# Patient Record
Sex: FEMALE | Race: WHITE | URBAN - METROPOLITAN AREA
[De-identification: names, ages, dates, MRNs, and addresses within clinical notes are randomized per-mention and may not be internally consistent; named-entity substitution may affect disease eponyms.]

---

## 2018-02-22 PROCEDURE — 88175 CYTOPATH C/V AUTO FLUID REDO: CPT | Performed by: OBSTETRICS & GYNECOLOGY

## 2025-05-23 ENCOUNTER — HOSPITAL ENCOUNTER (OUTPATIENT)
Age: 55
Discharge: HOME | End: 2025-05-23
Payer: COMMERCIAL

## 2025-05-23 VITALS
HEART RATE: 68 BPM | RESPIRATION RATE: 20 BRPM | OXYGEN SATURATION: 99 % | DIASTOLIC BLOOD PRESSURE: 85 MMHG | SYSTOLIC BLOOD PRESSURE: 124 MMHG | TEMPERATURE: 98 F

## 2025-05-23 DIAGNOSIS — H10.9 BACTERIAL CONJUNCTIVITIS OF LEFT EYE: Primary | ICD-10-CM

## 2025-05-23 DIAGNOSIS — J06.9 VIRAL URI: ICD-10-CM

## 2025-05-23 LAB — S PYO AG THROAT QL: NEGATIVE

## 2025-05-23 RX ORDER — POLYMYXIN B SULFATE AND TRIMETHOPRIM 1; 10000 MG/ML; [USP'U]/ML
1 SOLUTION OPHTHALMIC EVERY 6 HOURS
Qty: 10 ML | Refills: 0 | Status: SHIPPED | OUTPATIENT
Start: 2025-05-23 | End: 2025-05-25

## 2025-05-23 NOTE — ED PROVIDER NOTES
Patient Seen in: [facility]        History  Chief Complaint   Patient presents with    Eye Problem    Sore Throat     Stated Complaint: pink eye    Subjective:   55-year-old female with medical conditions as noted below presents with complaints of right eye redness and yellow drainage onset yesterday.  Nasal congestion, sinus pressure, intermittent headache and sore throat x 5 days.  No over-the-counter meds taken.  No fever/chills, difficulty swallowing, visual changes, dizziness.                      Objective:     Past Medical History:    Anxiety    Cervical dysplasia    Lyme disease              Past Surgical History:   Procedure Laterality Date          Cold therapy      Colonoscopy      Hysterectomy      with bowel and bladder repair (prolapse) with Dr. Kasia Goetz          Other surgical history      microdysectomy in the back                 Social History     Socioeconomic History    Marital status:    Tobacco Use    Smoking status: Never    Smokeless tobacco: Never   Substance and Sexual Activity    Alcohol use: Yes     Comment: 1-2 weekly    Drug use: No    Sexual activity: Yes     Birth control/protection: Hysterectomy     Social Drivers of Health     Transportation Needs: No Transportation Needs (2023)    Received from [facility] - Transportation     Lack of Transportation (Medical): No     Lack of Transportation (Non-Medical): No              Review of Systems   Constitutional:  Negative for chills and fever.   HENT:  Positive for congestion, sinus pressure and sore throat.    Eyes:  Positive for discharge and redness. Negative for photophobia, pain, itching and visual disturbance.   Gastrointestinal:  Negative for abdominal pain, diarrhea, nausea and vomiting.   Neurological:  Positive for headaches. Negative for dizziness and light-headedness.   All other systems reviewed and are negative.      Positive for stated  complaint: pink eye  Other systems are as noted in HPI.  Constitutional and vital signs reviewed.      All other systems reviewed and negative except as noted above.                  Physical Exam    ED Triage Vitals [05/23/25 1249]   /85   Pulse 68   Resp 20   Temp 98.3 °F (36.8 °C)   Temp src Oral   SpO2 99 %   O2 Device None (Room air)       Current Vitals:   Vital Signs  BP: 124/85  Pulse: 68  Resp: 20  Temp: 98.3 °F (36.8 °C)  Temp src: Oral    Oxygen Therapy  SpO2: 99 %  O2 Device: None (Room air)            Physical Exam  Vitals and nursing note reviewed.   Constitutional:       General: She is not in acute distress.     Appearance: Normal appearance. She is not ill-appearing.   HENT:      Head: Normocephalic.      Right Ear: Tympanic membrane and external ear normal.      Left Ear: Tympanic membrane and external ear normal.      Nose: Nose normal.      Mouth/Throat:      Mouth: Mucous membranes are moist.      Pharynx: Oropharynx is clear. Uvula midline. Posterior oropharyngeal erythema present.      Tonsils: No tonsillar exudate. 1+ on the left.   Eyes:      General: Lids are normal. Vision grossly intact.         Left eye: Discharge present.     Extraocular Movements: Extraocular movements intact.      Conjunctiva/sclera:      Left eye: Left conjunctiva is injected.      Pupils: Pupils are equal, round, and reactive to light.   Cardiovascular:      Rate and Rhythm: Normal rate and regular rhythm.   Pulmonary:      Effort: Pulmonary effort is normal.      Breath sounds: Normal breath sounds.   Musculoskeletal:         General: Normal range of motion.   Skin:     General: Skin is warm.   Neurological:      General: No focal deficit present.      Mental Status: She is alert and oriented to person, place, and time.      GCS: GCS eye subscore is 4. GCS verbal subscore is 5. GCS motor subscore is 6.   Psychiatric:         Behavior: Behavior is cooperative.                 ED Course  Labs Reviewed   POCT  RAPID STREP - Normal                            MDM             Medical Decision Making  Patient is well-appearing.  I discussed differentials with patient including but not limited to viral uri, sinusitis, strep pharyngitis. Viral vs bacterial conjunctivitis  Rapid strep negative  Push fluids, cool mist humidifier, warm salt water gargles, warm towel to clean lashes, good hand washing  Rx polytrim eye drop  otc meds prn  Fu with PCP. Return/ ED precautions discussed      Problems Addressed:  Bacterial conjunctivitis of left eye: acute illness or injury  Viral URI: acute illness or injury    Amount and/or Complexity of Data Reviewed  Labs: ordered. Decision-making details documented in ED Course.    Risk  OTC drugs.  Prescription drug management.        Disposition and Plan     Clinical Impression:  1. Bacterial conjunctivitis of left eye    2. Viral URI         Disposition:  Discharge  5/23/2025  1:27 pm    Follow-up:  Gaviota Olivas MD  [address] [address]  [phone]      or follow up with your Primary Doctor          Medications Prescribed:  Discharge Medication List as of 5/23/2025  1:21 PM                Supplementary Documentation:

## 2025-05-23 NOTE — ED INITIAL ASSESSMENT (HPI)
Pt presents with left eye redness x 24 hours.. Sore throat, sinus pressure with headache x 5 days.     No fever.

## 2025-05-25 RX ORDER — POLYMYXIN B SULFATE AND TRIMETHOPRIM 1; 10000 MG/ML; [USP'U]/ML
1 SOLUTION OPHTHALMIC EVERY 6 HOURS
Qty: 10 ML | Refills: 0 | Status: SHIPPED | OUTPATIENT
Start: 2025-05-25 | End: 2025-06-01

## 2025-05-25 NOTE — ED PROVIDER NOTES
Patient called stating that she lost her prescription medication.  Patient requested that a new prescription for polymixin B-trimethoprim be sent to pharmacy on file.